# Patient Record
Sex: MALE
[De-identification: names, ages, dates, MRNs, and addresses within clinical notes are randomized per-mention and may not be internally consistent; named-entity substitution may affect disease eponyms.]

---

## 2024-05-06 ENCOUNTER — NON-APPOINTMENT (OUTPATIENT)
Age: 44
End: 2024-05-06

## 2024-05-07 ENCOUNTER — APPOINTMENT (OUTPATIENT)
Dept: OTOLARYNGOLOGY | Facility: CLINIC | Age: 44
End: 2024-05-07
Payer: COMMERCIAL

## 2024-05-07 ENCOUNTER — NON-APPOINTMENT (OUTPATIENT)
Age: 44
End: 2024-05-07

## 2024-05-07 VITALS
HEIGHT: 67.72 IN | WEIGHT: 200 LBS | DIASTOLIC BLOOD PRESSURE: 80 MMHG | BODY MASS INDEX: 30.67 KG/M2 | SYSTOLIC BLOOD PRESSURE: 118 MMHG | HEART RATE: 63 BPM

## 2024-05-07 DIAGNOSIS — H93.8X2 OTHER SPECIFIED DISORDERS OF LEFT EAR: ICD-10-CM

## 2024-05-07 DIAGNOSIS — J34.2 DEVIATED NASAL SEPTUM: ICD-10-CM

## 2024-05-07 DIAGNOSIS — R06.83 SNORING: ICD-10-CM

## 2024-05-07 DIAGNOSIS — Z72.0 TOBACCO USE: ICD-10-CM

## 2024-05-07 DIAGNOSIS — H92.02 OTALGIA, LEFT EAR: ICD-10-CM

## 2024-05-07 DIAGNOSIS — Z78.9 OTHER SPECIFIED HEALTH STATUS: ICD-10-CM

## 2024-05-07 DIAGNOSIS — R51.9 HEADACHE, UNSPECIFIED: ICD-10-CM

## 2024-05-07 PROBLEM — Z00.00 ENCOUNTER FOR PREVENTIVE HEALTH EXAMINATION: Status: ACTIVE | Noted: 2024-05-07

## 2024-05-07 PROCEDURE — 99203 OFFICE O/P NEW LOW 30 MIN: CPT

## 2024-05-07 PROCEDURE — 92570 ACOUSTIC IMMITANCE TESTING: CPT

## 2024-05-07 PROCEDURE — 92557 COMPREHENSIVE HEARING TEST: CPT

## 2024-05-07 RX ORDER — AZELASTINE HYDROCHLORIDE AND FLUTICASONE PROPIONATE 137; 50 UG/1; UG/1
137-50 SPRAY, METERED NASAL
Qty: 1 | Refills: 5 | Status: ACTIVE | COMMUNITY
Start: 2024-05-07 | End: 1900-01-01

## 2024-05-07 NOTE — ASSESSMENT
[FreeTextEntry1] : Reviewed and reconciled medications, allergies, PMHx, PSHx, SocHx, FMHx.  Patient presents today stating that his PCP suggested that he had an ear infection. He denies otalgia. He states that he uses Q-tips because his ears itch. He states that he has had a hearing loss in his left ear since before this happened. He states that his left ear always feels clogged. He denies drainage from his ears. He denies tinnitus. He states that he had migraines in his left forehead/temple at the time of infection. He states that he snores at night with witnessed difficulty breathing. He states that another doctor ordered him a sleep study.  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 mild to severe mixed -8000 Hz AS ETD AU 96% understanding in the right ear at 45 dB 84% understanding in the left ear at 85 dB   Plan:  Audio - results interpreted by Dr. Burroughs and reviewed with the patient. -No q-tips. Let the warm water run in the ears while showering and dry with a towel -Sleep study pending -Start Dymista - 1 spray bilaterally BID, spray laterally -Ordered MRI Brain and Internal Auditory Canals w/wo contrast (at [facility]) -Discussed r/b/a of hearing aids pending results from MRI -FU with results from MRI

## 2024-05-07 NOTE — CONSULT LETTER
[Dear  ___] : Dear  [unfilled], [Consult Letter:] : I had the pleasure of evaluating your patient, [unfilled]. [Please see my note below.] : Please see my note below. [Consult Closing:] : Thank you very much for allowing me to participate in the care of this patient.  If you have any questions, please do not hesitate to contact me. [Sincerely,] : Sincerely, [FreeTextEntry3] :  Tito Burroughs MD FACS

## 2024-05-07 NOTE — DATA REVIEWED
[de-identified] : Audio 5/7/24: type Ad tymp, hearing  -8000 Hz AD type A tymp. mild to severe mixed -8000 Hz AS ETD AU 96% understanding in the right ear at 45 dB 84% understanding in the left ear at 85 dB

## 2024-05-07 NOTE — ADDENDUM
[FreeTextEntry1] :  Documented by Eric Gavin acting as scribe for Dr. Burroughs on 05/07/2024. All Medical record entries made by the Scribe were at my, Dr. Burroughs, direction and personally dictated by me on 05/07/2024 . I have reviewed the chart and agree that the record accurately reflects my personal performance of the history, physical exam, assessment and plan. I have also personally directed, reviewed, and agreed with the discharge instructions.

## 2024-05-07 NOTE — HISTORY OF PRESENT ILLNESS
[de-identified] : Patient presents today stating that his PCP suggested that he had an ear infection. He denies otalgia. He states that he uses Q-tips because his ears itch. He states that he has had a hearing loss in his left ear since before this happened. He states that his left ear always feels clogged. He denies drainage from his ears. He denies tinnitus. He states that he had migraines in his left forehead/temple at the time of infection. He states that he snores at night with witnessed difficulty breathing. He states that another doctor ordered him a sleep study.

## 2024-05-07 NOTE — REVIEW OF SYSTEMS
[Sneezing] : sneezing [Seasonal Allergies] : seasonal allergies [Ear Pain] : ear pain [Ear Itch] : ear itch [Hearing Loss] : hearing loss [Ear Drainage] : ear drainage [Ear Noises] : ear noises [Lightheadedness] : lightheadedness [Nasal Congestion] : nasal congestion [Recurrent Sinus Infections] : recurrent sinus infections [Sinus Pain] : sinus pain [Sinus Pressure] : sinus pressure [Discolored Nasal Discharge] : discolored nasal discharge [Swelling Neck] : swelling neck [Swelling Face] : face swelling [Negative] : Heme/Lymph

## 2024-05-07 NOTE — PHYSICAL EXAM
[Mayes Test Lateralizes To Left] : tone lateralization to the left [] : septum deviated bilaterally [Midline] : trachea located in midline position [Normal] : no rashes [Hearing Loss Right Only] : normal [Hearing Loss Left Only] : normal [de-identified] : dull [de-identified] : fluid [de-identified] :  mildly inflamed turbinates [de-identified] : class 1 [de-identified] : type 3 oral cavity. 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 temporal artery nontender

## 2024-05-17 ENCOUNTER — APPOINTMENT (OUTPATIENT)
Dept: MRI IMAGING | Facility: CLINIC | Age: 44
End: 2024-05-17
Payer: COMMERCIAL

## 2024-05-17 PROCEDURE — A9585: CPT

## 2024-05-17 PROCEDURE — 70553 MRI BRAIN STEM W/O & W/DYE: CPT

## 2024-05-22 ENCOUNTER — APPOINTMENT (OUTPATIENT)
Dept: OTOLARYNGOLOGY | Facility: CLINIC | Age: 44
End: 2024-05-22
Payer: COMMERCIAL

## 2024-05-22 VITALS
DIASTOLIC BLOOD PRESSURE: 72 MMHG | SYSTOLIC BLOOD PRESSURE: 104 MMHG | HEART RATE: 70 BPM | WEIGHT: 210 LBS | HEIGHT: 67.72 IN | BODY MASS INDEX: 32.2 KG/M2

## 2024-05-22 DIAGNOSIS — H69.93 UNSPECIFIED EUSTACHIAN TUBE DISORDER, BILATERAL: ICD-10-CM

## 2024-05-22 DIAGNOSIS — H93.12 TINNITUS, LEFT EAR: ICD-10-CM

## 2024-05-22 DIAGNOSIS — J31.0 CHRONIC RHINITIS: ICD-10-CM

## 2024-05-22 DIAGNOSIS — H90.3 SENSORINEURAL HEARING LOSS, BILATERAL: ICD-10-CM

## 2024-05-22 PROCEDURE — 99214 OFFICE O/P EST MOD 30 MIN: CPT

## 2024-05-22 RX ORDER — AZELASTINE HYDROCHLORIDE 137 UG/1
137 SPRAY, METERED NASAL TWICE DAILY
Qty: 1 | Refills: 5 | Status: ACTIVE | COMMUNITY
Start: 2024-05-22 | End: 1900-01-01

## 2024-05-22 RX ORDER — FLUTICASONE PROPIONATE 50 UG/1
50 SPRAY, METERED NASAL
Qty: 1 | Refills: 6 | Status: ACTIVE | COMMUNITY
Start: 2024-05-22 | End: 1900-01-01

## 2024-05-22 NOTE — ADDENDUM
[FreeTextEntry1] :  Documented by Eric Gavin acting as scribe for Dr. Burroughs on 05/22/2024. All Medical record entries made by the Scribe were at my, Dr. Burroughs, direction and personally dictated by me on 05/22/2024 . I have reviewed the chart and agree that the record accurately reflects my personal performance of the history, physical exam, assessment and plan. I have also personally directed, reviewed, and agreed with the discharge instructions.

## 2024-05-22 NOTE — ASSESSMENT
[FreeTextEntry1] : Reviewed and reconciled medications, allergies, PMHx, PSHx, SocHx, FMHx.  Patient with h/o left tinnitus, left otalgia, chronic rhinitis, bilateral ETD, and asymmetric SNHL presents today to discuss MRI results. He states that Dymista was too expensive. He states that his ears get itchy in the morning.  MR Brain and Internal Auditory Canals 5/17/24: -chronic sinus disease -thickening in the ethmoids -thickening in the frontals -thickening in the maxillarys -thickening in the sphenoids  Audio 5/7/24: -asymmetric HL L>R -ETD bilaterally   Plan: -Start Astelin - 2 sprays bilaterally BID, spray laterally -Start Flonase - 2 sprays bilaterally QD, spray laterally -Consider amplification - medical clearance provided -Greater than 50% of the interaction spent discussing results and treatment with the patient -FU in 6 months

## 2024-05-22 NOTE — DATA REVIEWED
[de-identified] : Audio 5/7/24: -asymmetric HL L>R -ETD bilaterally [de-identified] : MR Brain and Internal Auditory Canals 5/17/24: -chronic sinus disease -thickening in the ethmoids -thickening in the frontals -thickening in the maxillarys -thickening in the sphenoids

## 2024-05-22 NOTE — CONSULT LETTER
[Dear  ___] : Dear  [unfilled], [Courtesy Letter:] : I had the pleasure of seeing your patient, [unfilled], in my office today. [Please see my note below.] : Please see my note below. [Consult Closing:] : Thank you very much for allowing me to participate in the care of this patient.  If you have any questions, please do not hesitate to contact me. [Sincerely,] : Sincerely, [FreeTextEntry3] :  Tito Burroughs MD FACS

## 2024-05-22 NOTE — HISTORY OF PRESENT ILLNESS
[de-identified] : Patient with h/o left tinnitus, left otalgia, chronic rhinitis, bilateral ETD, and asymmetric SNHL presents today to discuss MRI results. He states that Dymista was too expensive. He states that his ears get itchy in the morning.

## 2024-12-16 ENCOUNTER — RESULT REVIEW (OUTPATIENT)
Age: 44
End: 2024-12-16

## 2024-12-16 ENCOUNTER — APPOINTMENT (OUTPATIENT)
Dept: OTOLARYNGOLOGY | Facility: CLINIC | Age: 44
End: 2024-12-16
Payer: COMMERCIAL

## 2024-12-16 VITALS
DIASTOLIC BLOOD PRESSURE: 73 MMHG | SYSTOLIC BLOOD PRESSURE: 105 MMHG | WEIGHT: 220 LBS | HEIGHT: 67 IN | HEART RATE: 70 BPM | BODY MASS INDEX: 34.53 KG/M2

## 2024-12-16 DIAGNOSIS — J34.829 NASAL VALVE COLLAPSE, UNSPECIFIED: ICD-10-CM

## 2024-12-16 DIAGNOSIS — J34.89 OTHER SPECIFIED DISORDERS OF NOSE AND NASAL SINUSES: ICD-10-CM

## 2024-12-16 DIAGNOSIS — J34.2 DEVIATED NASAL SEPTUM: ICD-10-CM

## 2024-12-16 DIAGNOSIS — H65.02 ACUTE SEROUS OTITIS MEDIA, LEFT EAR: ICD-10-CM

## 2024-12-16 DIAGNOSIS — J32.0 CHRONIC MAXILLARY SINUSITIS: ICD-10-CM

## 2024-12-16 DIAGNOSIS — R06.83 SNORING: ICD-10-CM

## 2024-12-16 DIAGNOSIS — J31.0 CHRONIC RHINITIS: ICD-10-CM

## 2024-12-16 DIAGNOSIS — G47.33 OBSTRUCTIVE SLEEP APNEA (ADULT) (PEDIATRIC): ICD-10-CM

## 2024-12-16 DIAGNOSIS — H69.93 UNSPECIFIED EUSTACHIAN TUBE DISORDER, BILATERAL: ICD-10-CM

## 2024-12-16 DIAGNOSIS — R06.5 MOUTH BREATHING: ICD-10-CM

## 2024-12-16 PROCEDURE — 31231 NASAL ENDOSCOPY DX: CPT

## 2024-12-16 PROCEDURE — 99214 OFFICE O/P EST MOD 30 MIN: CPT | Mod: 25

## 2024-12-16 PROCEDURE — 92557 COMPREHENSIVE HEARING TEST: CPT

## 2024-12-16 PROCEDURE — 92567 TYMPANOMETRY: CPT

## 2024-12-16 PROCEDURE — 70486 CT MAXILLOFACIAL W/O DYE: CPT

## 2025-06-23 ENCOUNTER — NON-APPOINTMENT (OUTPATIENT)
Age: 45
End: 2025-06-23

## 2025-06-25 ENCOUNTER — APPOINTMENT (OUTPATIENT)
Dept: PULMONOLOGY | Facility: CLINIC | Age: 45
End: 2025-06-25
Payer: COMMERCIAL

## 2025-06-25 VITALS
SYSTOLIC BLOOD PRESSURE: 112 MMHG | DIASTOLIC BLOOD PRESSURE: 70 MMHG | WEIGHT: 217 LBS | HEART RATE: 63 BPM | HEIGHT: 67 IN | BODY MASS INDEX: 34.06 KG/M2 | OXYGEN SATURATION: 97 %

## 2025-06-25 PROBLEM — Z82.3 FAMILY HISTORY OF CEREBROVASCULAR ACCIDENT (CVA): Status: ACTIVE | Noted: 2025-06-25

## 2025-06-25 PROBLEM — E78.5 HLD (HYPERLIPIDEMIA): Status: ACTIVE | Noted: 2025-06-25

## 2025-06-25 PROBLEM — Z80.0 FAMILY HISTORY OF PANCREATIC CANCER: Status: ACTIVE | Noted: 2025-06-25

## 2025-06-25 PROCEDURE — 99213 OFFICE O/P EST LOW 20 MIN: CPT

## 2025-06-25 PROCEDURE — G2211 COMPLEX E/M VISIT ADD ON: CPT | Mod: NC
